# Patient Record
Sex: FEMALE | Race: BLACK OR AFRICAN AMERICAN | HISPANIC OR LATINO | Employment: UNEMPLOYED | URBAN - METROPOLITAN AREA
[De-identification: names, ages, dates, MRNs, and addresses within clinical notes are randomized per-mention and may not be internally consistent; named-entity substitution may affect disease eponyms.]

---

## 2017-01-10 ENCOUNTER — OFFICE VISIT (OUTPATIENT)
Dept: INTERNAL MEDICINE | Facility: CLINIC | Age: 37
End: 2017-01-10

## 2017-01-10 VITALS
TEMPERATURE: 98 F | SYSTOLIC BLOOD PRESSURE: 116 MMHG | DIASTOLIC BLOOD PRESSURE: 70 MMHG | HEIGHT: 59 IN | BODY MASS INDEX: 59.07 KG/M2 | WEIGHT: 293 LBS

## 2017-01-10 DIAGNOSIS — N92.0 MENORRHAGIA WITH REGULAR CYCLE: Primary | ICD-10-CM

## 2017-01-10 LAB — HGB BLDA-MCNC: 10 G/DL

## 2017-01-10 PROCEDURE — 99213 OFFICE O/P EST LOW 20 MIN: CPT | Performed by: FAMILY MEDICINE

## 2017-01-10 PROCEDURE — 85018 HEMOGLOBIN: CPT | Performed by: FAMILY MEDICINE

## 2017-01-10 RX ORDER — MEDROXYPROGESTERONE ACETATE 10 MG/1
10 TABLET ORAL DAILY
Qty: 10 TABLET | Refills: 0 | Status: SHIPPED | OUTPATIENT
Start: 2017-01-10 | End: 2017-01-31

## 2017-01-10 NOTE — PROGRESS NOTES
Subjective   Rola Davila is a 36 y.o. female.     History of Present Illness   Here today as a work in for anemia and dizziness. Last seen 11/23/16 for 1mo recheck. Was seen 5/10/16 as a new patient. Just moved here from [place]. Labs 6/7/16 demo normal CMP, TSH and lipid with , tg 151, HDL 39, . CBC demo anemia with Hg 10.0 (low MCV, MCH)     1.RESP- allergies and cough variant asthma. Pt had partial response to Claritin, Flonase and singulair. Pt was given a trial with rescue inhaler but it was not adequate. Was started on symbicort with instruction and Asthma Action Plan in writing. At her recheck she had responded well but her insurance would not cover the combo. Pt was changed to breo.   2. PSYCH- depression with anxiety. Pt has had recurrent issues. Was c/o sad, irritable and anxious mood with crying easily, not sleeping, feeling guilty, anhedonia, feeling overwhelmed, fatigue. Was started on celexa but only reached 70% of goal. Was increased to 40mg and then started on CPAP for sleep apnea. Was doing well other than fatigue, possibly related to her anemia.   3. CV- HTN, currently on lisinopril HCT. Long term issues with recent restart of treatment.   4. DERM- recurrent boils. Improved after 1mo of septra. Has benzaclin to use and has only had 1 boil in recent months.   5. ORTHO- bilat CTS. Was referred to hand ortho with this appointment pending 6/23/16. Then at her last visit she reported right shoulder pain. 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 Just re injured it 4 days prior to her last visit. Was given Mobic and flexeril and did well wioth no prn use needed. Was seeing hand specialist with MRI neck pending.    6.HEME- anemia related to heavy periods. Pt has been on iron and prn high dose ibu. Was advised to add B12 with CPE pending to address the menses. Today she reports she got a period 2 days after her last visit and has not stopped bleeding since then. Ibu has  "not helped. Has had a tubal ligation.    The following portions of the patient's history were reviewed and updated as appropriate: current medications, past family history, past medical history, past social history, past surgical history and problem list.    Review of Systems   Constitutional: Positive for fatigue.   Cardiovascular: Negative for chest pain.   Gastrointestinal: Negative for abdominal distention and abdominal pain.   Skin: Negative for color change.   Neurological: Positive for dizziness and numbness (around mouth). Negative for tremors, speech difficulty and headaches.   Psychiatric/Behavioral: Negative for agitation and confusion.   All other systems reviewed and are negative.        Current Outpatient Prescriptions:   •  albuterol (VENTOLIN HFA) 108 (90 BASE) MCG/ACT inhaler, 2 puffs q4-6hr prn cough, shortness of breath or wheezing, Disp: 1 inhaler, Rfl: 2  •  benzoyl peroxide-erythromycin (BENZAMYCIN) 5-3 % gel, APPLY TO AFFECTED AREA(S) TWO TIMES A DAY, Disp: 1 tube, Rfl: 0  •  citalopram (CeleXA) 40 MG tablet, 1 tab po qd, Disp: 30 tablet, Rfl: 5  •  cyclobenzaprine (FLEXERIL) 10 MG tablet, 1 tab po qhs prn muscle spasm, Disp: 30 tablet, Rfl: 2  •  Fluticasone Furoate-Vilanterol (BREO ELLIPTA) 100-25 MCG/INH aerosol powder , Inhale 1 puff Daily., Disp: 28 each, Rfl: 5  •  gabapentin (NEURONTIN) 300 MG capsule, , Disp: , Rfl:   •  ibuprofen (ADVIL,MOTRIN) 800 MG tablet, Tid prn pain or inflammation or heavy periods, Disp: 90 tablet, Rfl: 1  •  lisinopril-hydrochlorothiazide (PRINZIDE,ZESTORETIC) 10-12.5 MG per tablet, Take 1 tablet by mouth Daily., Disp: 30 tablet, Rfl: 5  •  montelukast (SINGULAIR) 10 MG tablet, 1 tab po qd prn allergies, asthma or cough/congestion, Disp: 30 tablet, Rfl: 2    Objective     Visit Vitals   • /70   • Temp 98 °F (36.7 °C)   • Ht 59\" (149.9 cm)   • Wt (!) 304 lb 9.6 oz (138 kg)   • BMI 61.52 kg/m2       Physical Exam   Constitutional: She is oriented to " person, place, and time. She appears well-developed and well-nourished.   HENT:   Right Ear: Tympanic membrane and ear canal normal.   Left Ear: Tympanic membrane and ear canal normal.   Mouth/Throat: Oropharynx is clear and moist.   Eyes: Conjunctivae and EOM are normal. Pupils are equal, round, and reactive to light.   Neck: No thyromegaly present.   Cardiovascular: Normal rate and regular rhythm.    Pulmonary/Chest: Effort normal and breath sounds normal.   Neurological: She is alert and oriented to person, place, and time.   Skin: Skin is warm and dry.   Psychiatric: She has a normal mood and affect.   Vitals reviewed.      Assessment/Plan   There are no diagnoses linked to this encounter.    1. GYN- menorrhagia- Hg today stable at 10.0. will treat with progesterone burst. Will refer to Gyn as she will likely need an ablation and possibly a hysterectomy.  2. RECHECK- keep her CPE but may not need the pap then.

## 2017-01-10 NOTE — MR AVS SNAPSHOT
Rola Davila   1/10/2017 1:00 PM   Office Visit    Dept Phone:  [phone]   Encounter #:  [id number]    Provider:  Eva Chan MD   Department:  [facility] PRIMARY CARE                Your Full Care Plan              Today's Medication Changes          These changes are accurate as of: 1/10/17  1:56 PM.  If you have any questions, ask your nurse or doctor.               New Medication(s)Ordered:     medroxyPROGESTERone 10 MG tablet   Commonly known as:  PROVERA   Take 1 tablet by mouth Daily.   Started by:  vEa Chan MD            Where to Get Your Medications      These medications were sent to [address] [place] [address] RD & [address] - [phone]  - [phone] [place] [address]     Phone:  [phone]     medroxyPROGESTERone 10 MG tablet                  Your Updated Medication List          This list is accurate as of: 1/10/17  1:56 PM.  Always use your most recent med list.                albuterol 108 (90 BASE) MCG/ACT inhaler   Commonly known as:  VENTOLIN HFA   2 puffs q4-6hr prn cough, shortness of breath or wheezing       benzoyl peroxide-erythromycin 5-3 % gel   Commonly known as:  BENZAMYCIN   APPLY TO AFFECTED AREA(S) TWO TIMES A DAY       citalopram 40 MG tablet   Commonly known as:  CeleXA   1 tab po qd       cyclobenzaprine 10 MG tablet   Commonly known as:  FLEXERIL   1 tab po qhs prn muscle spasm       Fluticasone Furoate-Vilanterol 100-25 MCG/INH aerosol powder    Commonly known as:  BREO ELLIPTA   Inhale 1 puff Daily.       gabapentin 300 MG capsule   Commonly known as:  NEURONTIN       ibuprofen 800 MG tablet   Commonly known as:  ADVIL,MOTRIN   Tid prn pain or inflammation or heavy periods       lisinopril-hydrochlorothiazide 10-12.5 MG per tablet   Commonly known as:  PRINZIDE,ZESTORETIC   Take 1 tablet by mouth Daily.       medroxyPROGESTERone 10 MG tablet    Commonly known as:  PROVERA   Take 1 tablet by mouth Daily.       montelukast 10 MG tablet   Commonly known as:  SINGULAIR   1 tab po qd prn allergies, asthma or cough/congestion               We Performed the Following     Ambulatory Referral to Gynecology       You Were Diagnosed With        Codes Comments    Menorrhagia with regular cycle    -  Primary ICD-10-CM: N92.0  ICD-9-CM: 626.2       Instructions    1. GYN- menorrhagia- Hg today stable at 10.0. will treat with progesterone burst. Will refer to Gyn as she will likely need an ablation and possibly a hysterectomy.  2. RECHECK- keep her CPE but may not need the pap then.     Patient Instructions History      Upcoming Appointments     Visit Type Date Time Department    FOLLOW UP 1/10/2017  1:00 PM [facility]    PHYSICAL 1/31/2017  2:45 PM [facility]    FOLLOW UP 2/1/2017  2:30 PM [facility]      MyChart Signup     Our records indicate that you have an active [employer] account.    You can view your After Visit Summary by going to [employer] and logging in with your THYME username and password.  If you don't have a THYME username and password but a parent or guardian has access to your record, the parent or guardian should login with their own THYME username and password and access your record to view the After Visit Summary.    If you have questions, you can email [email or web address] or call [phone] to talk to our THYME staff.  Remember, THYME is NOT to be used for urgent needs.  For medical emergencies, dial 911.               Other Info from Your Visit           Your Appointments     Jan 31, 2017  2:45 PM EST   Physical with Eva Chan MD   [facility] PRIMARY CARE (--)    [facility] [place] [address] [address] [phone]           Arrive 15 minutes prior to appointment.            Feb 01, 2017  2:30 PM EST   Follow Up with Lazaro Arango MD  "  [facility] SLEEP MEDICINE (--)    [address]  [facility], located within [facility] [address] [phone]           Please bring completed new patient packets that were mailed to you prior to follow up as well as bringing a copy of insurnace card and photo ID to visit. Please bring downloadable chips/cards out of machines if you are on PAP therapy.              Allergies     No Known Allergies      Reason for Visit     Dizziness           Vital Signs     Blood Pressure Temperature Height Weight Body Mass Index Smoking Status    116/70 98 °F (36.7 °C) 59\" (149.9 cm) 304 lb 9.6 oz (138 kg) 61.52 kg/m2 Former Smoker      Problems and Diagnoses Noted     Heavy periods        "

## 2017-01-10 NOTE — PATIENT INSTRUCTIONS
1. GYN- menorrhagia- Hg today stable at 10.0. will treat with progesterone burst. Will refer to Gyn as she will likely need an ablation and possibly a hysterectomy.  2. RECHECK- keep her CPE but may not need the pap then.

## 2017-01-31 ENCOUNTER — OFFICE VISIT (OUTPATIENT)
Dept: INTERNAL MEDICINE | Facility: CLINIC | Age: 37
End: 2017-01-31

## 2017-01-31 VITALS
HEIGHT: 59 IN | BODY MASS INDEX: 59.07 KG/M2 | TEMPERATURE: 98.9 F | SYSTOLIC BLOOD PRESSURE: 116 MMHG | WEIGHT: 293 LBS | DIASTOLIC BLOOD PRESSURE: 64 MMHG

## 2017-01-31 DIAGNOSIS — L70.0 CYSTIC ACNE: ICD-10-CM

## 2017-01-31 DIAGNOSIS — Z00.00 ANNUAL PHYSICAL EXAM: Primary | ICD-10-CM

## 2017-01-31 DIAGNOSIS — I10 ESSENTIAL HYPERTENSION: ICD-10-CM

## 2017-01-31 DIAGNOSIS — J45.40 MODERATE PERSISTENT ASTHMA WITHOUT COMPLICATION: ICD-10-CM

## 2017-01-31 DIAGNOSIS — F41.8 DEPRESSION WITH ANXIETY: ICD-10-CM

## 2017-01-31 PROCEDURE — 99395 PREV VISIT EST AGE 18-39: CPT | Performed by: FAMILY MEDICINE

## 2017-01-31 PROCEDURE — 99213 OFFICE O/P EST LOW 20 MIN: CPT | Performed by: FAMILY MEDICINE

## 2017-01-31 RX ORDER — ERYTHROMYCIN AND BENZOYL PEROXIDE 30; 50 MG/G; MG/G
GEL TOPICAL 2 TIMES DAILY
Qty: 1 TUBE | Refills: 5 | Status: SHIPPED | OUTPATIENT
Start: 2017-01-31 | End: 2017-05-30

## 2017-01-31 RX ORDER — SPIRONOLACTONE 50 MG/1
50 TABLET, FILM COATED ORAL DAILY
Qty: 30 TABLET | Refills: 0 | Status: SHIPPED | OUTPATIENT
Start: 2017-01-31 | End: 2017-05-30 | Stop reason: SDUPTHER

## 2017-02-09 ENCOUNTER — OFFICE VISIT (OUTPATIENT)
Dept: OBSTETRICS AND GYNECOLOGY | Facility: CLINIC | Age: 37
End: 2017-02-09

## 2017-02-09 VITALS
DIASTOLIC BLOOD PRESSURE: 86 MMHG | WEIGHT: 293 LBS | SYSTOLIC BLOOD PRESSURE: 122 MMHG | HEIGHT: 59 IN | BODY MASS INDEX: 59.07 KG/M2

## 2017-02-09 DIAGNOSIS — N93.8 DUB (DYSFUNCTIONAL UTERINE BLEEDING): ICD-10-CM

## 2017-02-09 DIAGNOSIS — Z00.00 ANNUAL PHYSICAL EXAM: Primary | ICD-10-CM

## 2017-02-09 PROCEDURE — 99203 OFFICE O/P NEW LOW 30 MIN: CPT | Performed by: OBSTETRICS & GYNECOLOGY

## 2017-02-09 NOTE — PROGRESS NOTES
"Subjective   Rolatony Davila is an 36 y.o. woman who presents for irregular menses. She had been bleeding regularly. She is now bleeding every  days and menses are lasting many days. She changes her pad or tampon every 2 hours. Clots are 0 cm in size. Dysmenorrhea:none. Cyclic symptoms include: none. Current contraception: tubal ligation. History of infertility: no. History of abnormal Pap smear: no.    Menstrual History:  OB History      Para Term  AB TAB SAB Ectopic Multiple Living    4 4 4 0 0 0 0 0 0 3         Menarche age: 8  No LMP recorded.  Period Pattern: (!) Irregular  Menstrual Flow: Heavy  Menstrual Control: Maxi pad  Dysmenorrhea: (!) Moderate  Dysmenorrhea Symptoms: Cramping, Nausea, Diarrhea, Headache    The following portions of the patient's history were reviewed and updated as appropriate: allergies, current medications, past family history, past medical history, past social history, past surgical history and problem list.    Review of Systems  Constitutional: positive for fatigue  Respiratory: positive for sleep apnea, asthma  Cardiovascular: negative  Gastrointestinal: negative  Genitourinary:negative  Integument/breast: negative  Hematologic/lymphatic: negative  Musculoskeletal:positive for myalgias  Neurological: negative  Behavioral/Psych: positive for depression     Objective      Visit Vitals   • /86   • Ht 59\" (149.9 cm)   • Wt (!) 303 lb (137 kg)   • Breastfeeding No   • BMI 61.2 kg/m2       General:   alert, appears stated age, cooperative and morbidly obese   Heart: regular rate and rhythm, S1, S2 normal, no murmur, click, rub or gallop   Lungs: clear to auscultation bilaterally   Abdomen: soft, non-tender, without masses or organomegaly and protuberant   Vulva: normal   Vagina: normal mucosa   Cervix: no lesions   Uterus: normal size   Adnexa: normal adnexa          Assessment/Plan     Rola was seen today for menorrhagia.    Diagnoses and all orders for this " visit:    Annual physical exam  -     Liquid-based Pap Smear, Screening    DUB (dysfunctional uterine bleeding)      Plan hysteroscopy D and C  TVS

## 2017-02-28 DIAGNOSIS — J45.30 MILD PERSISTENT ASTHMA WITHOUT COMPLICATION: ICD-10-CM

## 2017-02-28 DIAGNOSIS — L70.0 CYSTIC ACNE: ICD-10-CM

## 2017-02-28 DIAGNOSIS — J30.89 OTHER ALLERGIC RHINITIS: ICD-10-CM

## 2017-02-28 RX ORDER — MONTELUKAST SODIUM 10 MG/1
TABLET ORAL
Qty: 30 TABLET | Refills: 2 | Status: SHIPPED | OUTPATIENT
Start: 2017-02-28 | End: 2017-06-16 | Stop reason: SDUPTHER

## 2017-02-28 RX ORDER — ALBUTEROL SULFATE 90 UG/1
AEROSOL, METERED RESPIRATORY (INHALATION)
Qty: 1 INHALER | Refills: 2 | Status: SHIPPED | OUTPATIENT
Start: 2017-02-28 | End: 2017-11-30 | Stop reason: SDUPTHER

## 2017-05-09 ENCOUNTER — OFFICE VISIT (OUTPATIENT)
Dept: INTERNAL MEDICINE | Facility: CLINIC | Age: 37
End: 2017-05-09

## 2017-05-09 VITALS
TEMPERATURE: 97.1 F | DIASTOLIC BLOOD PRESSURE: 76 MMHG | HEIGHT: 59 IN | BODY MASS INDEX: 59.07 KG/M2 | SYSTOLIC BLOOD PRESSURE: 128 MMHG | WEIGHT: 293 LBS

## 2017-05-09 DIAGNOSIS — S49.92XA SHOULDER INJURY, LEFT, INITIAL ENCOUNTER: Primary | ICD-10-CM

## 2017-05-09 PROCEDURE — 99213 OFFICE O/P EST LOW 20 MIN: CPT | Performed by: FAMILY MEDICINE

## 2017-05-09 RX ORDER — TRAMADOL HYDROCHLORIDE 50 MG/1
50 TABLET ORAL EVERY 6 HOURS PRN
Qty: 60 TABLET | Refills: 0 | Status: SHIPPED | OUTPATIENT
Start: 2017-05-09 | End: 2017-11-30

## 2017-05-09 RX ORDER — LISINOPRIL AND HYDROCHLOROTHIAZIDE 12.5; 1 MG/1; MG/1
1 TABLET ORAL DAILY
COMMUNITY
Start: 2017-03-29 | End: 2017-05-30

## 2017-05-12 ENCOUNTER — HOSPITAL ENCOUNTER (OUTPATIENT)
Dept: PHYSICAL THERAPY | Facility: HOSPITAL | Age: 37
Setting detail: THERAPIES SERIES
Discharge: HOME | End: 2017-05-12
Attending: FAMILY MEDICINE

## 2017-05-12 DIAGNOSIS — M25.512 LEFT SHOULDER PAIN, UNSPECIFIED CHRONICITY: Primary | ICD-10-CM

## 2017-05-12 PROCEDURE — 97162 PT EVAL MOD COMPLEX 30 MIN: CPT | Performed by: PHYSICAL THERAPIST

## 2017-05-16 ENCOUNTER — HOSPITAL ENCOUNTER (OUTPATIENT)
Dept: PHYSICAL THERAPY | Facility: HOSPITAL | Age: 37
Setting detail: THERAPIES SERIES
Discharge: HOME | End: 2017-05-16

## 2017-05-16 DIAGNOSIS — M25.512 LEFT SHOULDER PAIN, UNSPECIFIED CHRONICITY: Primary | ICD-10-CM

## 2017-05-16 PROCEDURE — 97110 THERAPEUTIC EXERCISES: CPT | Performed by: PHYSICAL THERAPIST

## 2017-05-16 PROCEDURE — G0283 ELEC STIM OTHER THAN WOUND: HCPCS | Performed by: PHYSICAL THERAPIST

## 2017-05-23 ENCOUNTER — APPOINTMENT (OUTPATIENT)
Dept: PHYSICAL THERAPY | Facility: HOSPITAL | Age: 37
End: 2017-05-23

## 2017-05-26 ENCOUNTER — APPOINTMENT (OUTPATIENT)
Dept: PHYSICAL THERAPY | Facility: HOSPITAL | Age: 37
End: 2017-05-26

## 2017-05-30 ENCOUNTER — OFFICE VISIT (OUTPATIENT)
Dept: INTERNAL MEDICINE | Facility: CLINIC | Age: 37
End: 2017-05-30

## 2017-05-30 VITALS
WEIGHT: 293 LBS | TEMPERATURE: 97.2 F | BODY MASS INDEX: 59.07 KG/M2 | HEIGHT: 59 IN | DIASTOLIC BLOOD PRESSURE: 74 MMHG | SYSTOLIC BLOOD PRESSURE: 118 MMHG

## 2017-05-30 DIAGNOSIS — L70.0 CYSTIC ACNE: ICD-10-CM

## 2017-05-30 DIAGNOSIS — J45.40 MODERATE PERSISTENT ASTHMA WITHOUT COMPLICATION: Primary | ICD-10-CM

## 2017-05-30 DIAGNOSIS — I10 ESSENTIAL HYPERTENSION: ICD-10-CM

## 2017-05-30 DIAGNOSIS — M77.9 TENDONITIS: ICD-10-CM

## 2017-05-30 LAB
ANION GAP SERPL CALCULATED.3IONS-SCNC: 2 MMOL/L (ref 3–11)
BUN BLD-MCNC: 10 MG/DL (ref 9–23)
BUN/CREAT SERPL: 12.5 (ref 7–25)
CALCIUM SPEC-SCNC: 9.4 MG/DL (ref 8.7–10.4)
CHLORIDE SERPL-SCNC: 109 MMOL/L (ref 99–109)
CO2 SERPL-SCNC: 24 MMOL/L (ref 20–31)
CREAT BLD-MCNC: 0.8 MG/DL (ref 0.6–1.3)
GFR SERPL CREATININE-BSD FRML MDRD: 97 ML/MIN/1.73
GLUCOSE BLD-MCNC: 202 MG/DL (ref 70–100)
POTASSIUM BLD-SCNC: 4.6 MMOL/L (ref 3.5–5.5)
SODIUM BLD-SCNC: 135 MMOL/L (ref 132–146)

## 2017-05-30 PROCEDURE — 99214 OFFICE O/P EST MOD 30 MIN: CPT | Performed by: FAMILY MEDICINE

## 2017-05-30 PROCEDURE — 80048 BASIC METABOLIC PNL TOTAL CA: CPT | Performed by: FAMILY MEDICINE

## 2017-05-30 RX ORDER — SPIRONOLACTONE 50 MG/1
100 TABLET, FILM COATED ORAL DAILY
Qty: 30 TABLET | Refills: 0 | Status: SHIPPED | OUTPATIENT
Start: 2017-05-30 | End: 2017-06-16 | Stop reason: SDUPTHER

## 2017-05-30 RX ORDER — CLINDAMYCIN PHOSPHATE 10 MG/G
GEL TOPICAL 2 TIMES DAILY
Qty: 30 G | Refills: 0 | Status: SHIPPED | OUTPATIENT
Start: 2017-05-30 | End: 2017-06-16 | Stop reason: SDUPTHER

## 2017-05-31 ENCOUNTER — APPOINTMENT (OUTPATIENT)
Dept: PHYSICAL THERAPY | Facility: HOSPITAL | Age: 37
End: 2017-05-31

## 2017-06-01 ENCOUNTER — OFFICE VISIT (OUTPATIENT)
Dept: INTERNAL MEDICINE | Facility: CLINIC | Age: 37
End: 2017-06-01

## 2017-06-01 VITALS
DIASTOLIC BLOOD PRESSURE: 78 MMHG | SYSTOLIC BLOOD PRESSURE: 116 MMHG | BODY MASS INDEX: 59.07 KG/M2 | HEIGHT: 59 IN | WEIGHT: 293 LBS | TEMPERATURE: 97.5 F

## 2017-06-01 DIAGNOSIS — IMO0001 UNCONTROLLED TYPE 2 DIABETES MELLITUS WITHOUT COMPLICATION, WITH LONG-TERM CURRENT USE OF INSULIN: Primary | ICD-10-CM

## 2017-06-01 LAB — HBA1C MFR BLD: 8.8 %

## 2017-06-01 PROCEDURE — 99215 OFFICE O/P EST HI 40 MIN: CPT | Performed by: FAMILY MEDICINE

## 2017-06-01 PROCEDURE — 83036 HEMOGLOBIN GLYCOSYLATED A1C: CPT | Performed by: FAMILY MEDICINE

## 2017-06-01 NOTE — PROGRESS NOTES
Celeste Davila is a 37 y.o. female.     History of Present Illness   Here today to discuss high sugar. Last seen 5/30/17 for recheck BP. Had BMP due to being on spironolactone. Cr and potassium were normal but glu was 202. Lab 6/2016 was normal.    ENDO- today pt reports she has no hx DM but she has a very strong fam hx on her mother and father's sides. She is having polydipsia and polyuria as well as blurry vision and fatigue. Last eye exam 2mo ago. No BDR.     The following portions of the patient's history were reviewed and updated as appropriate: current medications, past family history, past medical history, past social history, past surgical history and problem list.    Review of Systems   Cardiovascular: Negative for chest pain.   Gastrointestinal: Negative for abdominal distention and abdominal pain.   Skin: Negative for color change.   Neurological: Negative for tremors, speech difficulty and headaches.   Psychiatric/Behavioral: Negative for agitation and confusion.   All other systems reviewed and are negative.        Current Outpatient Prescriptions:   •  albuterol (VENTOLIN HFA) 108 (90 BASE) MCG/ACT inhaler, 2 puffs q4-6hr prn cough, shortness of breath or wheezing, Disp: 1 inhaler, Rfl: 2  •  citalopram (CeleXA) 40 MG tablet, 1 tab po qd, Disp: 30 tablet, Rfl: 5  •  clindamycin (CLINDAGEL) 1 % gel, Apply  topically 2 (Two) Times a Day., Disp: 30 g, Rfl: 0  •  cyclobenzaprine (FLEXERIL) 10 MG tablet, 1 tab po qhs prn muscle spasm, Disp: 30 tablet, Rfl: 2  •  gabapentin (NEURONTIN) 300 MG capsule, , Disp: , Rfl:   •  ibuprofen (ADVIL,MOTRIN) 800 MG tablet, Tid prn pain or inflammation or heavy periods, Disp: 90 tablet, Rfl: 1  •  mometasone-formoterol (DULERA) 100-5 MCG/ACT inhaler, Inhale 1 puff 2 (Two) Times a Day., Disp: 13 g, Rfl: 5  •  montelukast (SINGULAIR) 10 MG tablet, 1 tab po qd prn allergies, asthma or cough/congestion, Disp: 30 tablet, Rfl: 2  •  SITagliptin-MetFORMIN HCl ER  "(JANUMET XR) 100-1000 MG tablet sustained-release 24 hour, Take 1 tablet by mouth Daily., Disp: 30 tablet, Rfl: 0  •  spironolactone (ALDACTONE) 50 MG tablet, Take 2 tablets by mouth Daily., Disp: 30 tablet, Rfl: 0  •  traMADol (ULTRAM) 50 MG tablet, Take 1 tablet by mouth Every 6 (Six) Hours As Needed for Moderate Pain (4-6)., Disp: 60 tablet, Rfl: 0    Objective     /78  Temp 97.5 °F (36.4 °C)  Ht 59\" (149.9 cm)  Wt (!) 306 lb (139 kg)  BMI 61.8 kg/m2    Physical Exam   Constitutional: She is oriented to person, place, and time. She appears well-developed and well-nourished.   HENT:   Right Ear: Tympanic membrane and ear canal normal.   Left Ear: Tympanic membrane and ear canal normal.   Mouth/Throat: Oropharynx is clear and moist.   Eyes: Conjunctivae and EOM are normal. Pupils are equal, round, and reactive to light.   Neck: No thyromegaly present.   Cardiovascular: Normal rate and regular rhythm.    Pulmonary/Chest: Effort normal and breath sounds normal.   Neurological: She is alert and oriented to person, place, and time.   Skin: Skin is warm and dry.   Psychiatric: She has a normal mood and affect.   Vitals reviewed.      Assessment/Plan   Rola was seen today for follow-up.    Diagnoses and all orders for this visit:    Uncontrolled type 2 diabetes mellitus without complication, with long-term current use of insulin  -     POC Glycosylated Hemoglobin (Hb A1C)  -     SITagliptin-MetFORMIN HCl ER (JANUMET XR) 100-1000 MG tablet sustained-release 24 hour; Take 1 tablet by mouth Daily.     Time spent 60 min with 55 min face to face education re diabetes, see below.    1. ENDO- diabetes- new diagnosis. Education using the car engine analogy provided. Will do trial with janumet. If required by PA, will send in metformin. Pt given samples today of janumet 100/1000 to start today. Education re FS provided using the graph and log. Pt will do FS once daily at alternating times and log for recheck. Diet " education intiated with handout provided and specifics discussed including that she has to stop eating cany. Will get an A1C today 8.8 and pt is given handout with this education as well.   2. RECHECK - 1mo

## 2017-06-01 NOTE — PATIENT INSTRUCTIONS
1. ENDO- diabetes- new diagnosis. Education using the car engine analogy provided. Will do trial with janumet. If required by PA, will send in metformin. Pt given samples today of janumet 100/1000 to start today. Education re FS provided using the graph and log. Pt will do FS once daily at alternating times and log for recheck. Diet education intiated with handout provided and specifics discussed including that she has to stop eating cany. Will get an A1C today 8.8 and pt is given handout with this education as well.   2. RECHECK - 1mo

## 2017-06-02 ENCOUNTER — APPOINTMENT (OUTPATIENT)
Dept: PHYSICAL THERAPY | Facility: HOSPITAL | Age: 37
End: 2017-06-02

## 2017-06-14 ENCOUNTER — TELEPHONE (OUTPATIENT)
Dept: INTERNAL MEDICINE | Facility: CLINIC | Age: 37
End: 2017-06-14

## 2017-06-14 NOTE — TELEPHONE ENCOUNTER
PT HAS BEEN TAKING SAMPLES OF JANUMET BECAUSE [employer] WOULD NOT COVER. PT ONLY HAS ABOUT 1-2 PILLS LEFT AND SHE JUST GOT NOTIFICATION FROM [employer] THAT THEY ARE NOT GOING TO COVER. PT WANTED TO KNOW IF DR. KU WANTED TO CHANGE TO SOMETHING ELSE OR KEEP HER IN SAMPLES.

## 2017-06-15 RX ORDER — GLUCOSAM/CHON-MSM1/C/MANG/BOSW 500-416.6
TABLET ORAL
COMMUNITY
Start: 2017-06-02 | End: 2018-05-24 | Stop reason: SDUPTHER

## 2017-06-15 RX ORDER — CALCIUM CITRATE/VITAMIN D3 200MG-6.25
TABLET ORAL
COMMUNITY
Start: 2017-06-02 | End: 2018-05-24 | Stop reason: SDUPTHER

## 2017-06-16 DIAGNOSIS — F41.8 DEPRESSION WITH ANXIETY: ICD-10-CM

## 2017-06-16 DIAGNOSIS — J30.89 OTHER ALLERGIC RHINITIS: ICD-10-CM

## 2017-06-16 DIAGNOSIS — I10 ESSENTIAL HYPERTENSION: ICD-10-CM

## 2017-06-16 DIAGNOSIS — L70.0 CYSTIC ACNE: ICD-10-CM

## 2017-06-19 RX ORDER — LISINOPRIL AND HYDROCHLOROTHIAZIDE 12.5; 1 MG/1; MG/1
TABLET ORAL
Qty: 30 TABLET | Refills: 4 | Status: SHIPPED | OUTPATIENT
Start: 2017-06-19 | End: 2017-11-30

## 2017-06-19 RX ORDER — SPIRONOLACTONE 50 MG/1
TABLET, FILM COATED ORAL
Qty: 30 TABLET | Refills: 0 | Status: SHIPPED | OUTPATIENT
Start: 2017-06-19 | End: 2017-06-21 | Stop reason: SDUPTHER

## 2017-06-19 RX ORDER — CLINDAMYCIN PHOSPHATE 10 MG/G
GEL TOPICAL
Qty: 60 G | Refills: 0 | Status: SHIPPED | OUTPATIENT
Start: 2017-06-19 | End: 2017-11-30 | Stop reason: SDUPTHER

## 2017-06-19 RX ORDER — CITALOPRAM 40 MG/1
TABLET ORAL
Qty: 30 TABLET | Refills: 4 | Status: SHIPPED | OUTPATIENT
Start: 2017-06-19 | End: 2017-11-30 | Stop reason: SDUPTHER

## 2017-06-19 RX ORDER — MONTELUKAST SODIUM 10 MG/1
TABLET ORAL
Qty: 30 TABLET | Refills: 1 | Status: SHIPPED | OUTPATIENT
Start: 2017-06-19 | End: 2017-08-27 | Stop reason: SDUPTHER

## 2017-06-21 DIAGNOSIS — L70.0 CYSTIC ACNE: ICD-10-CM

## 2017-06-21 DIAGNOSIS — I10 ESSENTIAL HYPERTENSION: ICD-10-CM

## 2017-06-21 RX ORDER — SPIRONOLACTONE 50 MG/1
TABLET, FILM COATED ORAL
Qty: 30 TABLET | Refills: 0 | Status: SHIPPED | OUTPATIENT
Start: 2017-06-21 | End: 2017-07-05 | Stop reason: SDUPTHER

## 2017-06-26 ENCOUNTER — DOCUMENTATION (OUTPATIENT)
Dept: PHYSICAL THERAPY | Facility: HOSPITAL | Age: 37
End: 2017-06-26

## 2017-06-26 DIAGNOSIS — M25.512 LEFT SHOULDER PAIN, UNSPECIFIED CHRONICITY: Primary | ICD-10-CM

## 2017-06-26 NOTE — THERAPY DISCHARGE NOTE
Outpatient Physical Therapy Discharge Summary         Patient Name: Rola Davila  : 1980  MRN: [id number]    Today's Date: 2017    Visit Dx:    ICD-10-CM ICD-9-CM   1. Left shoulder pain, unspecified chronicity M25.512 719.41             PT OP Goals       17 1500       PT Short Term Goals    STG Date to Achieve 17  -ARI     STG 1 Patient to demonstrate improved motion of the arm with walking and at rest  -ARI     STG 1 Progress Not Met  -ARI     STG 2 Patient to demonstrate ABD to at least 90 degrees before painful limitation  -ARI     STG 2 Progress Not Met  -ARI     STG 3 Patient to demonstrate shoulde flexion to at least 110 degrees  -ARI     STG 3 Progress Not Met  -ARI     Long Term Goals    LTG Date to Achieve 17  -ARI     LTG 1 Patient to demonstrates shoulder flex/ABD WFL LUE  -ARI     LTG 1 Progress Not Met  -ARI     LTG 2 Patient to demonstrate minimal pain with resisted left shoulder all planes  -ARI     LTG 2 Progress Not Met  -ARI     LTG 3 Patient to demonstrate independence with HEP  -ARI     LTG 3 Progress Not Met  -ARI     LTG 4 Patient to improve quick dash to at least 40%  -ARI     LTG 4 Progress Not Met  -ARI       User Key  (r) = Recorded By, (t) = Taken By, (c) = Cosigned By    Initials Name Provider Type    ARI Mulligan, PT Physical Therapist          OP PT Discharge Summary  Date of Discharge: 17  Reason for Discharge: Non-compliant  Outcomes Achieved: Refer to plan of care for updates on goals achieved  Discharge Destination: Unknown  Discharge Instructions: Patient stopped attending PT without explanation      2/4 visits attended.    Shawn Mulligan, PT  2017

## 2017-06-30 DIAGNOSIS — IMO0001 UNCONTROLLED TYPE 2 DIABETES MELLITUS WITHOUT COMPLICATION, WITH LONG-TERM CURRENT USE OF INSULIN: ICD-10-CM

## 2017-06-30 RX ORDER — SITAGLIPTIN AND METFORMIN HYDROCHLORIDE 1000; 100 MG/1; MG/1
TABLET, FILM COATED, EXTENDED RELEASE ORAL
Qty: 30 TABLET | Refills: 0 | OUTPATIENT
Start: 2017-06-30

## 2017-07-05 ENCOUNTER — OFFICE VISIT (OUTPATIENT)
Dept: INTERNAL MEDICINE | Facility: CLINIC | Age: 37
End: 2017-07-05

## 2017-07-05 VITALS
DIASTOLIC BLOOD PRESSURE: 76 MMHG | WEIGHT: 293 LBS | HEIGHT: 59 IN | SYSTOLIC BLOOD PRESSURE: 124 MMHG | TEMPERATURE: 97.6 F | BODY MASS INDEX: 59.07 KG/M2

## 2017-07-05 DIAGNOSIS — E11.9 TYPE 2 DIABETES MELLITUS WITHOUT COMPLICATION, WITHOUT LONG-TERM CURRENT USE OF INSULIN (HCC): Primary | ICD-10-CM

## 2017-07-05 DIAGNOSIS — I10 ESSENTIAL HYPERTENSION: ICD-10-CM

## 2017-07-05 DIAGNOSIS — L70.0 CYSTIC ACNE: ICD-10-CM

## 2017-07-05 PROCEDURE — 99213 OFFICE O/P EST LOW 20 MIN: CPT | Performed by: FAMILY MEDICINE

## 2017-07-05 RX ORDER — SPIRONOLACTONE 50 MG/1
50 TABLET, FILM COATED ORAL DAILY
Qty: 30 TABLET | Refills: 2 | Status: SHIPPED | OUTPATIENT
Start: 2017-07-05 | End: 2018-05-24 | Stop reason: SDUPTHER

## 2017-07-05 NOTE — PATIENT INSTRUCTIONS
1. ENDO- diabetes- clinically improved. Will continue the janumet and spironolactone. RF today. Advised she can cut down to 2x/wk on her FS now. However, she can also do any additional FS she needs for diet choices, when feeling high or low on her sugar or when sick.   2. RECHECK- 3mo routine

## 2017-07-05 NOTE — PROGRESS NOTES
Subjective   Rola Davila is a 37 y.o. female.     History of Present Illness   Here for 1mo recheck new diagnosis of diabetes. Was seen 6/1/17 for initial diabetes discussion.  Was seen 5/30/17 for recheck BP. Had BMP due to being on spironolactone. Cr and potassium were normal but glu was 202.Was seen 1/31/17 for CPE and routine. Was seen 5/10/16 as a new patient. Just moved here from [place]. Labs 6/7/16 demo normal CMP, TSH and lipid with , tg 151, HDL 39, . CBC demo anemia with Hg 10.0 (low MCV, MCH)       1.ENDO- diabetes. Diagnosed 6/1/17 with baseline glu 202 and A1C 8.8. Pt symptomatic with polydipsia and polyuria as well as blurry vision and fatigue. Last eye exam 4/2017 with no BDR. Pt was seen and given education. Was started on janumet. Was also given Rx for glucometer, to do daily FS, alternating times. Today pt reports she had some initial diarrhea but this resolved after a couple of days. Has been doing FS and brings log with FS fasting initially in 140's, improved to 106-128, pre meal improved to , post meal 137-142.  Has changed her soda to Coke Zero. Still has symptoms except for blurry vision. Is still on spironolactone.    2. RESP- allergies and cough variant asthma. Currently on Claritin, Flonase, singulair and dulera qhs. Has Asthma Action Plan in writing.     3. ORTHO- bilat CTS. Was given Mobic and flexeril and referred to hand ortho. Was seeing hand specialist with MRI neck pending.Pt was then seen 5/9/17 after she slipped on the stairs and wrenched her shoulder. Was given tramadol and referred to PT. Shoulder improved.   4. CV- HTN?. Pt was on lisinopril HCT long term but stopped it on her own. BP stayed at goal but pt got swelling. Was given spironolactone which did not help much but was then diagnosed with DM.   5.PSYCH- depression with anxiety. Pt has had recurrent issues. Was c/o sad, irritable and anxious mood with crying easily, not sleeping, feeling guilty, anhedonia,  feeling overwhelmed, fatigue. Was started on celexa but only reached 70% of goal. Was increased to 40mg and then started on CPAP for sleep apnea. Was doing well other than fatigue, likely related to anemia and DM. Pt scheduled counseling but did not pursue it.   6. DERM- recurrent boils. Improved after 1mo of septra. Has clindamycin gel to use and had only had 1 boil in recent months.   7. GYN- menorrhagia with associated anemia. Pt referred to Gyn for possible ablation vs hysterectomy.    The following portions of the patient's history were reviewed and updated as appropriate: allergies, current medications, past family history, past social history, past surgical history and problem list.    Review of Systems   Constitutional: Positive for fatigue.   Cardiovascular: Negative for chest pain.   Gastrointestinal: Negative for abdominal distention and abdominal pain.   Endocrine: Positive for polyuria.   Skin: Negative for color change.   Neurological: Positive for headaches. Negative for tremors and speech difficulty.   Psychiatric/Behavioral: Negative for agitation and confusion.   All other systems reviewed and are negative.        Current Outpatient Prescriptions:   •  albuterol (VENTOLIN HFA) 108 (90 BASE) MCG/ACT inhaler, 2 puffs q4-6hr prn cough, shortness of breath or wheezing, Disp: 1 inhaler, Rfl: 2  •  citalopram (CeleXA) 40 MG tablet, TAKE ONE TABLET BY MOUTH DAILY, Disp: 30 tablet, Rfl: 4  •  clindamycin (CLINDAGEL) 1 % gel, APPLY TOPICALLY TWO TIMES A DAY, Disp: 60 g, Rfl: 0  •  cyclobenzaprine (FLEXERIL) 10 MG tablet, 1 tab po qhs prn muscle spasm, Disp: 30 tablet, Rfl: 2  •  gabapentin (NEURONTIN) 300 MG capsule, , Disp: , Rfl:   •  ibuprofen (ADVIL,MOTRIN) 800 MG tablet, Tid prn pain or inflammation or heavy periods, Disp: 90 tablet, Rfl: 1  •  lisinopril-hydrochlorothiazide (PRINZIDE,ZESTORETIC) 10-12.5 MG per tablet, TAKE ONE TABLET BY MOUTH DAILY, Disp: 30 tablet, Rfl: 4  •  metFORMIN (GLUCOPHAGE)  "500 MG tablet, TAKE ONE TABLET BY MOUTH TWICE A DAY WITH MEALS, Disp: 60 tablet, Rfl: 0  •  mometasone-formoterol (DULERA) 100-5 MCG/ACT inhaler, Inhale 1 puff 2 (Two) Times a Day., Disp: 13 g, Rfl: 5  •  montelukast (SINGULAIR) 10 MG tablet, TAKE ONE TABLET BY MOUTH DAILY AS NEEDED FOR ALLERGIES, ASTHMA, OR COUGH/CONGESTION, Disp: 30 tablet, Rfl: 1  •  spironolactone (ALDACTONE) 50 MG tablet, TAKE TWO TABLETS BY MOUTH DAILY, Disp: 30 tablet, Rfl: 0  •  traMADol (ULTRAM) 50 MG tablet, Take 1 tablet by mouth Every 6 (Six) Hours As Needed for Moderate Pain (4-6)., Disp: 60 tablet, Rfl: 0  •  TRUE METRIX BLOOD GLUCOSE TEST test strip, , Disp: , Rfl:   •  TRUEPLUS LANCETS 28G misc, , Disp: , Rfl:     Objective     /76  Temp 97.6 °F (36.4 °C)  Ht 59\" (149.9 cm)  Wt 300 lb (136 kg)  BMI 60.59 kg/m2    Physical Exam   Constitutional: She is oriented to person, place, and time. She appears well-developed and well-nourished.   HENT:   Right Ear: Tympanic membrane and ear canal normal.   Left Ear: Tympanic membrane and ear canal normal.   Mouth/Throat: Oropharynx is clear and moist.   Eyes: Conjunctivae and EOM are normal. Pupils are equal, round, and reactive to light.   Neck: No thyromegaly present.   Cardiovascular: Normal rate and regular rhythm.    Pulmonary/Chest: Effort normal and breath sounds normal.   Neurological: She is alert and oriented to person, place, and time.   Skin: Skin is warm and dry.   Psychiatric: She has a normal mood and affect.   Vitals reviewed.      Assessment/Plan   Rola was seen today for follow-up.    Diagnoses and all orders for this visit:    Type 2 diabetes mellitus without complication, without long-term current use of insulin  -     SITagliptin-MetFORMIN HCl ER (JANUMET XR) 100-1000 MG tablet sustained-release 24 hour; Take 1 tablet by mouth Daily.    Cystic acne  -     spironolactone (ALDACTONE) 50 MG tablet; Take 1 tablet by mouth Daily.    Essential hypertension  -     " spironolactone (ALDACTONE) 50 MG tablet; Take 1 tablet by mouth Daily.        1. ENDO- diabetes- clinically improved. Will continue the janumet and spironolactone. RF today. Advised she can cut down to 2x/wk on her FS now. However, she can also do any additional FS she needs for diet choices, when feeling high or low on her sugar or when sick.   2. RECHECK- 3mo routine

## 2017-07-07 ENCOUNTER — OFFICE VISIT (OUTPATIENT)
Dept: RETAIL CLINIC | Facility: CLINIC | Age: 37
End: 2017-07-07

## 2017-07-07 DIAGNOSIS — Z02.83 ENCOUNTER FOR DRUG SCREENING: Primary | ICD-10-CM

## 2017-07-18 DIAGNOSIS — L70.0 CYSTIC ACNE: ICD-10-CM

## 2017-07-18 DIAGNOSIS — I10 ESSENTIAL HYPERTENSION: ICD-10-CM

## 2017-07-19 RX ORDER — SPIRONOLACTONE 50 MG/1
TABLET, FILM COATED ORAL
Qty: 30 TABLET | Refills: 0 | Status: SHIPPED | OUTPATIENT
Start: 2017-07-19 | End: 2017-11-30

## 2017-08-27 DIAGNOSIS — J30.89 OTHER ALLERGIC RHINITIS: ICD-10-CM

## 2017-08-28 RX ORDER — MONTELUKAST SODIUM 10 MG/1
TABLET ORAL
Qty: 30 TABLET | Refills: 0 | Status: SHIPPED | OUTPATIENT
Start: 2017-08-28 | End: 2017-10-21 | Stop reason: SDUPTHER

## 2017-10-21 DIAGNOSIS — J30.89 OTHER ALLERGIC RHINITIS: ICD-10-CM

## 2017-10-23 RX ORDER — MONTELUKAST SODIUM 10 MG/1
TABLET ORAL
Qty: 30 TABLET | Refills: 0 | Status: SHIPPED | OUTPATIENT
Start: 2017-10-23 | End: 2018-05-24 | Stop reason: SDUPTHER

## 2017-11-03 DIAGNOSIS — L70.0 CYSTIC ACNE: ICD-10-CM

## 2017-11-03 RX ORDER — CLINDAMYCIN PHOSPHATE 10 MG/G
GEL TOPICAL
Refills: 0 | OUTPATIENT
Start: 2017-11-03

## 2017-11-30 ENCOUNTER — OFFICE VISIT (OUTPATIENT)
Dept: INTERNAL MEDICINE | Facility: CLINIC | Age: 37
End: 2017-11-30

## 2017-11-30 VITALS
SYSTOLIC BLOOD PRESSURE: 122 MMHG | TEMPERATURE: 97.8 F | DIASTOLIC BLOOD PRESSURE: 84 MMHG | HEIGHT: 59 IN | WEIGHT: 293 LBS | BODY MASS INDEX: 59.07 KG/M2

## 2017-11-30 DIAGNOSIS — J45.40 MODERATE PERSISTENT ASTHMA WITHOUT COMPLICATION: ICD-10-CM

## 2017-11-30 DIAGNOSIS — N92.0 MENORRHAGIA WITH REGULAR CYCLE: ICD-10-CM

## 2017-11-30 DIAGNOSIS — F41.8 DEPRESSION WITH ANXIETY: ICD-10-CM

## 2017-11-30 DIAGNOSIS — L70.0 CYSTIC ACNE: ICD-10-CM

## 2017-11-30 DIAGNOSIS — J45.30 MILD PERSISTENT ASTHMA WITHOUT COMPLICATION: ICD-10-CM

## 2017-11-30 DIAGNOSIS — E11.9 TYPE 2 DIABETES MELLITUS WITHOUT COMPLICATION, WITHOUT LONG-TERM CURRENT USE OF INSULIN (HCC): Primary | ICD-10-CM

## 2017-11-30 LAB — HBA1C MFR BLD: 6.1 %

## 2017-11-30 PROCEDURE — 82043 UR ALBUMIN QUANTITATIVE: CPT | Performed by: FAMILY MEDICINE

## 2017-11-30 PROCEDURE — 99214 OFFICE O/P EST MOD 30 MIN: CPT | Performed by: FAMILY MEDICINE

## 2017-11-30 PROCEDURE — 90471 IMMUNIZATION ADMIN: CPT | Performed by: FAMILY MEDICINE

## 2017-11-30 PROCEDURE — 82570 ASSAY OF URINE CREATININE: CPT | Performed by: FAMILY MEDICINE

## 2017-11-30 PROCEDURE — 83036 HEMOGLOBIN GLYCOSYLATED A1C: CPT | Performed by: FAMILY MEDICINE

## 2017-11-30 PROCEDURE — 90686 IIV4 VACC NO PRSV 0.5 ML IM: CPT | Performed by: FAMILY MEDICINE

## 2017-11-30 RX ORDER — ALBUTEROL SULFATE 90 UG/1
AEROSOL, METERED RESPIRATORY (INHALATION)
Qty: 1 INHALER | Refills: 2 | Status: SHIPPED | OUTPATIENT
Start: 2017-11-30 | End: 2018-05-24 | Stop reason: SDUPTHER

## 2017-11-30 RX ORDER — IBUPROFEN 800 MG/1
TABLET ORAL
Qty: 90 TABLET | Refills: 1 | Status: SHIPPED | OUTPATIENT
Start: 2017-11-30

## 2017-11-30 RX ORDER — CITALOPRAM 40 MG/1
40 TABLET ORAL DAILY
Qty: 30 TABLET | Refills: 4 | Status: SHIPPED | OUTPATIENT
Start: 2017-11-30 | End: 2018-05-24 | Stop reason: SDUPTHER

## 2017-11-30 RX ORDER — CLINDAMYCIN PHOSPHATE 10 MG/G
GEL TOPICAL 2 TIMES DAILY
Qty: 60 G | Refills: 0 | Status: SHIPPED | OUTPATIENT
Start: 2017-11-30 | End: 2018-05-24 | Stop reason: SDUPTHER

## 2017-11-30 NOTE — PATIENT INSTRUCTIONS
1. ENDO- diabetes- at goal per FS. Will check an A1C today. RF meds. Pt will get her next eye exam in April 2018. Foot exam and micro albumin today. Flu shot today.  2. RESP- asthma- stable. RF dulera today  3. DERM- acne- current flare. RF clindamycin  4. PSYCH- depression with anxiety- mood at goal. RF celexa today  5. GYN- discussed hormone fluctuations. Advised she can try Evening Primrose for the hot flashes if needed. RF ibu today  6. RECHECK- 4mo CPE

## 2017-11-30 NOTE — PROGRESS NOTES
Subjective   Rola Davila is a 37 y.o. female.     History of Present Illness   Here for recheck diabetes. Last seen 7/5/17 for 1mo recheck DM, did not keep the 3mo recheck. Was seen 6/1/17 for new diagnosis. Was seen 5/30/17 for recheck BP. Had BMP due to being on spironolactone. Cr and potassium were normal but glu was 202.Was seen 1/31/17 for CPE and routine. Was seen 5/10/16 as a new patient. Just moved here from [place]. Labs 6/7/16 demo normal CMP, TSH and lipid with , tg 151, HDL 39, . CBC demo anemia with Hg 10.0 (low MCV, MCH)    1.ENDO- diabetes. Diagnosed 6/1/17 with baseline glu 202 and A1C 8.8. Pt symptomatic with polydipsia and polyuria as well as blurry vision and fatigue. Last eye exam 4/2017 with no BDR. Pt was seen and given education. Was started on janumet. Pt did well with FS initially in 140's, improved to 106-128 fasting, pre meal , post meal 137-142. Was still needing spironolactone. Was continued on this combo with 3mo recheck. Today she reports her sugars have been between 68 and 137 with the highest related to eating sugar.       2. RESP- allergies and cough variant asthma. Currently on Claritin, Flonase, singulair and dulera qhs. Has Asthma Action Plan in writing.  Today pt reports she is still doing well on dulera. Needs RF.     3. ORTHO- bilat CTS. Was given Mobic and flexeril and referred to hand ortho. Was seeing hand specialist with MRI neck pending.Pt was then seen 5/9/17 after she slipped on the stairs and wrenched her shoulder. Was given tramadol and referred to PT. Shoulder improved. Today pt reports she is no longer on tramadol. Is doing well.    4. PSYCH- depression with anxiety. Pt has had recurrent issues. Was c/o sad, irritable and anxious mood with crying easily, not sleeping, feeling guilty, anhedonia, feeling overwhelmed, fatigue. Was started on celexa but only reached 70% of goal. Was increased to 40mg and then started on CPAP for sleep apnea. Was doing  well other than fatigue, likely related to anemia and DM. Pt scheduled counseling but did not pursue it. Today pt reports her mood is still at goal. Needs RF.    5. DERM- recurrent boils. Improved after 1mo of septra. Has clindamycin gel to use and had only had 1 boil in recent months. Today pt reports she is doing well with the gel. Has needed it more recently and needs RF.    6. GYN- menorrhagia with associated anemia. Pt referred to Gyn. Today pt reports that she is back to regular periods qod that are no prolonged or painful, just heavy. Still takes the ibu prn. Is having hot flashes.    The following portions of the patient's history were reviewed and updated as appropriate: current medications, past family history, past medical history, past social history, past surgical history and problem list.    Review of Systems   Cardiovascular: Negative for chest pain.   Gastrointestinal: Negative for abdominal distention and abdominal pain.   Skin: Negative for color change.   Neurological: Negative for tremors, speech difficulty and headaches.   Psychiatric/Behavioral: Negative for agitation and confusion.   All other systems reviewed and are negative.        Current Outpatient Prescriptions:   •  albuterol (VENTOLIN HFA) 108 (90 Base) MCG/ACT inhaler, 2 puffs q4-6hr prn cough, shortness of breath or wheezing, Disp: 1 inhaler, Rfl: 2  •  citalopram (CeleXA) 40 MG tablet, Take 1 tablet by mouth Daily., Disp: 30 tablet, Rfl: 4  •  clindamycin (CLINDAGEL) 1 % gel, Apply  topically 2 (Two) Times a Day., Disp: 60 g, Rfl: 0  •  cyclobenzaprine (FLEXERIL) 10 MG tablet, 1 tab po qhs prn muscle spasm, Disp: 30 tablet, Rfl: 2  •  ibuprofen (ADVIL,MOTRIN) 800 MG tablet, Tid prn pain or inflammation or heavy periods, Disp: 90 tablet, Rfl: 1  •  mometasone-formoterol (DULERA) 100-5 MCG/ACT inhaler, Inhale 1 puff 2 (Two) Times a Day., Disp: 13 g, Rfl: 5  •  montelukast (SINGULAIR) 10 MG tablet, TAKE ONE TABLET BY MOUTH DAILY AS  "NEEDED FOR ALLERGIES, ASTHMA, OR COUGH/CONGESTION, Disp: 30 tablet, Rfl: 0  •  SITagliptin-MetFORMIN HCl ER (JANUMET XR) 100-1000 MG tablet sustained-release 24 hour, Take 1 tablet by mouth Daily., Disp: 30 tablet, Rfl: 2  •  spironolactone (ALDACTONE) 50 MG tablet, Take 1 tablet by mouth Daily., Disp: 30 tablet, Rfl: 2  •  TRUE METRIX BLOOD GLUCOSE TEST test strip, , Disp: , Rfl:   •  TRUEPLUS LANCETS 28G misc, , Disp: , Rfl:     Objective     /84  Temp 97.8 °F (36.6 °C)  Ht 59\" (149.9 cm)  Wt 294 lb 12.8 oz (134 kg)  BMI 59.54 kg/m2    Physical Exam   Constitutional: She is oriented to person, place, and time. She appears well-developed and well-nourished.   HENT:   Right Ear: Tympanic membrane and ear canal normal.   Left Ear: Tympanic membrane and ear canal normal.   Mouth/Throat: Oropharynx is clear and moist.   Eyes: Conjunctivae and EOM are normal. Pupils are equal, round, and reactive to light.   Neck: No thyromegaly present.   Cardiovascular: Normal rate and regular rhythm.    Pulmonary/Chest: Effort normal and breath sounds normal.   Neurological: She is alert and oriented to person, place, and time.   Skin: Skin is warm and dry.   Diabetic foot exam reveals good pulses; normal skin with no skin breaks or rashes; skin dry between the toes, light touch sensation intact. No onychomycosis.   Psychiatric: She has a normal mood and affect.   Vitals reviewed.      Assessment/Plan   Rola was seen today for follow-up.    Diagnoses and all orders for this visit:    Type 2 diabetes mellitus without complication, without long-term current use of insulin  -     Flu Vaccine Quad PF 3YR+  -     POC Glycosylated Hemoglobin (Hb A1C)  -     Microalbumin / Creatinine Urine Ratio - Urine, Clean Catch    Cystic acne  -     clindamycin (CLINDAGEL) 1 % gel; Apply  topically 2 (Two) Times a Day.    Depression with anxiety  -     citalopram (CeleXA) 40 MG tablet; Take 1 tablet by mouth Daily.    Menorrhagia with " regular cycle  -     ibuprofen (ADVIL,MOTRIN) 800 MG tablet; Tid prn pain or inflammation or heavy periods    Mild persistent asthma without complication  -     albuterol (VENTOLIN HFA) 108 (90 Base) MCG/ACT inhaler; 2 puffs q4-6hr prn cough, shortness of breath or wheezing    Moderate persistent asthma without complication  -     mometasone-formoterol (DULERA) 100-5 MCG/ACT inhaler; Inhale 1 puff 2 (Two) Times a Day.        1. ENDO- diabetes- at goal per FS. Will check an A1C today 6.1. RF meds. Pt will get her next eye exam in April 2018. Foot exam and micro albumin today. Flu shot today.  2. RESP- asthma- stable. RF dulera today  3. DERM- acne- current flare. RF clindamycin  4. PSYCH- depression with anxiety- mood at goal. RF celexa today  5. GYN- discussed hormone fluctuations. Advised she can try Evening Primrose for the hot flashes if needed. RF ibu today  6. RECHECK- 4mo CPE

## 2017-12-03 LAB
CREAT 24H UR-MCNC: 211.4 MG/DL
MICROALBUMIN UR-MCNC: 19.5 UG/ML
MICROALBUMIN/CREAT UR: 9.2 MG/G CREAT (ref 0–30)

## 2018-01-18 DIAGNOSIS — J45.30 MILD PERSISTENT ASTHMA WITHOUT COMPLICATION: ICD-10-CM

## 2018-01-18 RX ORDER — ALBUTEROL SULFATE 90 UG/1
AEROSOL, METERED RESPIRATORY (INHALATION)
Qty: 18 G | Refills: 1 | Status: SHIPPED | OUTPATIENT
Start: 2018-01-18 | End: 2018-05-24 | Stop reason: SDUPTHER

## 2018-03-05 DIAGNOSIS — N92.0 MENORRHAGIA WITH REGULAR CYCLE: ICD-10-CM

## 2018-03-07 RX ORDER — IBUPROFEN 800 MG/1
TABLET ORAL
Qty: 90 TABLET | Refills: 1 | OUTPATIENT
Start: 2018-03-07

## 2018-05-24 DIAGNOSIS — L70.0 CYSTIC ACNE: ICD-10-CM

## 2018-05-24 DIAGNOSIS — E11.9 TYPE 2 DIABETES MELLITUS WITHOUT COMPLICATION, WITHOUT LONG-TERM CURRENT USE OF INSULIN (HCC): ICD-10-CM

## 2018-05-24 DIAGNOSIS — J45.30 MILD PERSISTENT ASTHMA WITHOUT COMPLICATION: ICD-10-CM

## 2018-05-24 DIAGNOSIS — J30.89 OTHER ALLERGIC RHINITIS: ICD-10-CM

## 2018-05-24 DIAGNOSIS — I10 ESSENTIAL HYPERTENSION: ICD-10-CM

## 2018-05-24 DIAGNOSIS — J45.40 MODERATE PERSISTENT ASTHMA WITHOUT COMPLICATION: ICD-10-CM

## 2018-05-24 DIAGNOSIS — S46.811A TRAPEZIUS MUSCLE STRAIN, RIGHT, INITIAL ENCOUNTER: ICD-10-CM

## 2018-05-24 DIAGNOSIS — F41.8 DEPRESSION WITH ANXIETY: ICD-10-CM

## 2018-05-24 RX ORDER — MONTELUKAST SODIUM 10 MG/1
TABLET ORAL
Qty: 30 TABLET | Refills: 0 | Status: SHIPPED | OUTPATIENT
Start: 2018-05-24

## 2018-05-24 RX ORDER — SPIRONOLACTONE 50 MG/1
50 TABLET, FILM COATED ORAL DAILY
Qty: 30 TABLET | Refills: 1 | Status: SHIPPED | OUTPATIENT
Start: 2018-05-24

## 2018-05-24 RX ORDER — CITALOPRAM 40 MG/1
40 TABLET ORAL DAILY
Qty: 30 TABLET | Refills: 1 | Status: SHIPPED | OUTPATIENT
Start: 2018-05-24

## 2018-05-24 RX ORDER — CLINDAMYCIN PHOSPHATE 10 MG/G
GEL TOPICAL EVERY 12 HOURS SCHEDULED
Qty: 60 G | Refills: 1 | Status: SHIPPED | OUTPATIENT
Start: 2018-05-24

## 2018-05-24 RX ORDER — CYCLOBENZAPRINE HCL 10 MG
TABLET ORAL
Qty: 30 TABLET | Refills: 1 | Status: SHIPPED | OUTPATIENT
Start: 2018-05-24

## 2018-05-24 RX ORDER — CALCIUM CITRATE/VITAMIN D3 200MG-6.25
TABLET ORAL
Qty: 100 EACH | Refills: 0 | Status: SHIPPED | OUTPATIENT
Start: 2018-05-24

## 2018-05-24 RX ORDER — GLUCOSAM/CHON-MSM1/C/MANG/BOSW 500-416.6
TABLET ORAL
Qty: 100 EACH | Refills: 0 | Status: SHIPPED | OUTPATIENT
Start: 2018-05-24

## 2018-05-24 RX ORDER — ALBUTEROL SULFATE 90 UG/1
AEROSOL, METERED RESPIRATORY (INHALATION)
Qty: 1 INHALER | Refills: 1 | Status: SHIPPED | OUTPATIENT
Start: 2018-05-24

## 2018-05-24 RX ORDER — ALBUTEROL SULFATE 90 UG/1
AEROSOL, METERED RESPIRATORY (INHALATION)
Qty: 18 G | Refills: 1 | Status: SHIPPED | OUTPATIENT
Start: 2018-05-24